# Patient Record
Sex: FEMALE | URBAN - METROPOLITAN AREA
[De-identification: names, ages, dates, MRNs, and addresses within clinical notes are randomized per-mention and may not be internally consistent; named-entity substitution may affect disease eponyms.]

---

## 2024-11-22 ENCOUNTER — OFFICE VISIT (OUTPATIENT)
Dept: URBAN - METROPOLITAN AREA CLINIC 23 | Facility: CLINIC | Age: 34
End: 2024-11-22

## 2024-12-02 ENCOUNTER — OFFICE VISIT (OUTPATIENT)
Dept: URBAN - METROPOLITAN AREA CLINIC 23 | Facility: CLINIC | Age: 34
End: 2024-12-02
Payer: COMMERCIAL

## 2024-12-02 VITALS
HEART RATE: 80 BPM | HEIGHT: 62 IN | WEIGHT: 104.8 LBS | SYSTOLIC BLOOD PRESSURE: 96 MMHG | BODY MASS INDEX: 19.29 KG/M2 | DIASTOLIC BLOOD PRESSURE: 64 MMHG | TEMPERATURE: 97.9 F

## 2024-12-02 DIAGNOSIS — K82.4 GALLBLADDER POLYP: ICD-10-CM

## 2024-12-02 DIAGNOSIS — K80.20 GALLSTONES: ICD-10-CM

## 2024-12-02 PROCEDURE — 99204 OFFICE O/P NEW MOD 45 MIN: CPT | Performed by: INTERNAL MEDICINE

## 2024-12-02 NOTE — PREVIOUS WORKUP REVIEWED EXTERNAL MEDICAL RECORD
.  ENDOSCOPIES    LABS -Labs 9/14/2024: WBC 4.9, hemoglobin 13.9, platelet 223, total protein 7.6, albumin 4.6, total bilirubin 0.78, GGT 14, AST 14, ALT 14, alkaline phosphatase 69, BUN 14, creatinine 0.66.   IMAGES -Abdominal ultrasound 9/14/2024: Normal liver.  No ascites.  Normal bile ducts.  Gallbladder with 0.69 cm and 0.66 cm gallstones.  Several variable sized polyps, maximum 0.75 cm.  Pancreatic duct normal.  Normal pancreas.  Normal spleen. -CT abdomen pelvis 9/14/2024: Suspicious radiolucent stones in the gallbladder.  No focal lesion in the liver, pancreas.

## 2024-12-02 NOTE — HPI-TODAY'S VISIT:
34-year-old female presents for cholelithiasis and gallbladder polyps. Gallbladder polyps were found on routine health checkup in [place] 2-3 years ago.  September 2020 4 repeat ultrasound and CT scan in [place] showed gallstones as well as polyps without change in size.  Denies abdominal pain. She is currently pregnant.  Due in June 2025.  Indigestion.

## 2024-12-19 ENCOUNTER — DASHBOARD ENCOUNTERS (OUTPATIENT)
Age: 34
End: 2024-12-19

## 2024-12-19 PROBLEM — 235919008: Status: ACTIVE | Noted: 2024-12-19

## 2024-12-19 PROBLEM — 197433003: Status: ACTIVE | Noted: 2024-12-19
